# Patient Record
Sex: FEMALE | Race: WHITE
[De-identification: names, ages, dates, MRNs, and addresses within clinical notes are randomized per-mention and may not be internally consistent; named-entity substitution may affect disease eponyms.]

---
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
## 2022-03-16 ENCOUNTER — RX RENEWAL (OUTPATIENT)
Age: 22
End: 2022-03-16

## 2022-08-03 ENCOUNTER — APPOINTMENT (OUTPATIENT)
Dept: OBGYN | Facility: CLINIC | Age: 22
End: 2022-08-03

## 2022-10-19 ENCOUNTER — APPOINTMENT (OUTPATIENT)
Dept: UROLOGY | Facility: CLINIC | Age: 22
End: 2022-10-19

## 2022-11-02 ENCOUNTER — APPOINTMENT (OUTPATIENT)
Dept: UROLOGY | Facility: CLINIC | Age: 22
End: 2022-11-02

## 2022-11-02 VITALS
DIASTOLIC BLOOD PRESSURE: 88 MMHG | SYSTOLIC BLOOD PRESSURE: 137 MMHG | WEIGHT: 210 LBS | HEART RATE: 99 BPM | HEIGHT: 67 IN | BODY MASS INDEX: 32.96 KG/M2 | RESPIRATION RATE: 17 BRPM

## 2022-11-02 DIAGNOSIS — N39.44 NOCTURNAL ENURESIS: ICD-10-CM

## 2022-11-02 PROCEDURE — 99204 OFFICE O/P NEW MOD 45 MIN: CPT

## 2022-11-02 NOTE — PHYSICAL EXAM
[General Appearance - Well Developed] : well developed [General Appearance - Well Nourished] : well nourished [Normal Appearance] : normal appearance [Well Groomed] : well groomed [General Appearance - In No Acute Distress] : no acute distress [Edema] : no peripheral edema [Respiration, Rhythm And Depth] : normal respiratory rhythm and effort [Exaggerated Use Of Accessory Muscles For Inspiration] : no accessory muscle use [Abdomen Soft] : soft [Abdomen Tenderness] : non-tender [Abdomen Mass (___ Cm)] : no abdominal mass palpated [Abdomen Hernia] : no hernia was discovered [Costovertebral Angle Tenderness] : no ~M costovertebral angle tenderness [FreeTextEntry1] : pvr- 10 ml  [Normal Station and Gait] : the gait and station were normal for the patient's age [] : no rash [No Focal Deficits] : no focal deficits [Oriented To Time, Place, And Person] : oriented to person, place, and time [Affect] : the affect was normal [Mood] : the mood was normal [Not Anxious] : not anxious [No Palpable Adenopathy] : no palpable adenopathy

## 2022-11-02 NOTE — HISTORY OF PRESENT ILLNESS
[FreeTextEntry1] : patient with hx of bed wetting that strted after bein toilet trained\par was treated with DDAVP\par stopped 3 years ago and denies any INC \par however has had 3 lb weight gain and irreg menses in the past year where she has noticd some bedwetting]\par denies any change in diet , fluids, travel, sexual partner, denies STDS\par no new school or work env;t\par no new stressors\par denies uits or hematuria \par here for further eval :

## 2022-11-02 NOTE — ASSESSMENT
[FreeTextEntry1] : patient with hx of bed wetting that strted after bein toilet trained\par was treated with DDAVP\par stopped 3 years ago and denies any INC \par however has had 3 lb weight gain and irreg menses in the past year where she has noticd some bedwetting]\par denies any change in diet , fluids, travel, sexual partner, denies STDS\par no new school or work env;t\par no new stressors\par denies uits or hematuria \par here for further eval :\par 1- check urine\par 2- pVR low \par 3- recommend GYN eval hansa due to menses abnl \par 4- voiding diary

## 2022-11-02 NOTE — REVIEW OF SYSTEMS
[Eyesight Problems] : eyesight problems [Heartburn] : heartburn [Genital yeast infection] : genital yeast infection [Strong urge to urinate] : strong urge to urinate [Anxiety] : anxiety [Depression] : depression [Negative] : Neurological [see HPI] : see HPI [Hot Flashes] : hot flashes [Easy Bruising] : a tendency for easy bruising

## 2022-11-04 LAB
APPEARANCE: CLEAR
BACTERIA UR CULT: NORMAL
BACTERIA: NEGATIVE
BILIRUBIN URINE: NEGATIVE
BLOOD URINE: NEGATIVE
COLOR: NORMAL
GLUCOSE QUALITATIVE U: NEGATIVE
HYALINE CASTS: 2 /LPF
KETONES URINE: NEGATIVE
LEUKOCYTE ESTERASE URINE: NEGATIVE
MICROSCOPIC-UA: NORMAL
NITRITE URINE: NEGATIVE
PH URINE: 7
PROTEIN URINE: NEGATIVE
RED BLOOD CELLS URINE: 2 /HPF
SPECIFIC GRAVITY URINE: 1.02
SQUAMOUS EPITHELIAL CELLS: 4 /HPF
UROBILINOGEN URINE: NORMAL
WHITE BLOOD CELLS URINE: 2 /HPF

## 2023-06-05 ENCOUNTER — APPOINTMENT (OUTPATIENT)
Dept: OBGYN | Facility: CLINIC | Age: 23
End: 2023-06-05
Payer: COMMERCIAL

## 2023-06-05 VITALS
SYSTOLIC BLOOD PRESSURE: 132 MMHG | BODY MASS INDEX: 37.67 KG/M2 | HEIGHT: 67 IN | DIASTOLIC BLOOD PRESSURE: 78 MMHG | WEIGHT: 240 LBS

## 2023-06-05 DIAGNOSIS — N91.4 SECONDARY OLIGOMENORRHEA: ICD-10-CM

## 2023-06-05 PROCEDURE — 36415 COLL VENOUS BLD VENIPUNCTURE: CPT

## 2023-06-05 PROCEDURE — 99213 OFFICE O/P EST LOW 20 MIN: CPT | Mod: 25

## 2023-06-05 RX ORDER — NORGESTIMATE AND ETHINYL ESTRADIOL 0.25-0.035
0.25-35 KIT ORAL
Qty: 84 | Refills: 3 | Status: ACTIVE | COMMUNITY
Start: 2022-03-16 | End: 1900-01-01

## 2023-06-05 NOTE — HISTORY OF PRESENT ILLNESS
[FreeTextEntry1] : 24yo nullip LMP 11/2021 here for secondary oligomenorrhea. Pt self dc'ed OCPs at this time. [No] : Patient does not have concerns regarding sex [Previously active] : previously active

## 2023-06-05 NOTE — DISCUSSION/SUMMARY
[FreeTextEntry1] : - hormone panel sent\par - [facility] referral rendered\par - OCPs restarted; return in 3 months for annual exam and medication f/u

## 2023-06-07 LAB
DHEA-S SERPL-MCNC: 254 UG/DL
ESTRADIOL SERPL-MCNC: 41 PG/ML
FSH SERPL-MCNC: 5.6 IU/L
LH SERPL-ACNC: 13.3 IU/L
PROGEST SERPL-MCNC: 0.3 NG/ML
PROLACTIN SERPL-MCNC: 9.6 NG/ML
T4 FREE SERPL-MCNC: 0.9 NG/DL
TESTOST SERPL-MCNC: 82.8 NG/DL
TSH SERPL-ACNC: 1.29 UIU/ML

## 2023-06-08 ENCOUNTER — NON-APPOINTMENT (OUTPATIENT)
Age: 23
End: 2023-06-08

## 2023-06-08 DIAGNOSIS — R79.89 OTHER SPECIFIED ABNORMAL FINDINGS OF BLOOD CHEMISTRY: ICD-10-CM

## 2023-09-05 ENCOUNTER — APPOINTMENT (OUTPATIENT)
Dept: OBGYN | Facility: CLINIC | Age: 23
End: 2023-09-05

## 2024-08-21 ENCOUNTER — NON-APPOINTMENT (OUTPATIENT)
Age: 24
End: 2024-08-21

## 2024-08-22 ENCOUNTER — APPOINTMENT (OUTPATIENT)
Dept: UROLOGY | Facility: CLINIC | Age: 24
End: 2024-08-22
Payer: COMMERCIAL

## 2024-08-22 VITALS
HEIGHT: 67 IN | HEART RATE: 111 BPM | OXYGEN SATURATION: 99 % | WEIGHT: 262 LBS | BODY MASS INDEX: 41.12 KG/M2 | DIASTOLIC BLOOD PRESSURE: 88 MMHG | SYSTOLIC BLOOD PRESSURE: 150 MMHG

## 2024-08-22 VITALS — SYSTOLIC BLOOD PRESSURE: 144 MMHG | DIASTOLIC BLOOD PRESSURE: 85 MMHG

## 2024-08-22 DIAGNOSIS — F42.9 OBSESSIVE-COMPULSIVE DISORDER, UNSPECIFIED: ICD-10-CM

## 2024-08-22 DIAGNOSIS — N39.44 NOCTURNAL ENURESIS: ICD-10-CM

## 2024-08-22 DIAGNOSIS — F32.A DEPRESSION, UNSPECIFIED: ICD-10-CM

## 2024-08-22 PROCEDURE — 99214 OFFICE O/P EST MOD 30 MIN: CPT

## 2024-08-22 RX ORDER — OMEPRAZOLE 20 MG/1
TABLET, DELAYED RELEASE ORAL
Refills: 0 | Status: ACTIVE | COMMUNITY

## 2024-08-22 RX ORDER — BUPROPION HYDROCHLORIDE 150 MG/1
150 TABLET, FILM COATED ORAL
Refills: 0 | Status: ACTIVE | COMMUNITY

## 2024-08-22 RX ORDER — DESMOPRESSIN ACETATE 0.2 MG/1
TABLET ORAL
Refills: 0 | Status: ACTIVE | COMMUNITY

## 2024-08-22 RX ORDER — FLUVOXAMINE MALEATE 150 MG/1
150 CAPSULE, EXTENDED RELEASE ORAL
Refills: 0 | Status: ACTIVE | COMMUNITY

## 2024-08-24 NOTE — LETTER BODY
[Dear  ___] : Dear  [unfilled], [Consult Letter:] : I had the pleasure of evaluating your patient, [unfilled]. [Please see my note below.] : Please see my note below. [Consult Closing:] : Thank you very much for allowing me to participate in the care of this patient.  If you have any questions, please do not hesitate to contact me. [FreeTextEntry1] : Please see my note. I will keep you informed. [FreeTextEntry3] : Sincerely,  Esperanza Carrasco MD Clinical  [facility]

## 2024-08-24 NOTE — HISTORY OF PRESENT ILLNESS
[FreeTextEntry1] : 08/22/2024: GARY AGUILAR is a 24 year-old F who presents with mom for urinary frequency during daytime and nocturnal enuresis.  Was given Desmopressin by PCP, Dr. Marie. - was on and off until it came back again, and she started taking it again. She is currently on it right now. - Takes about 0.6mg each day (x3's/day at 0.2mg each pill) - Reports it is helping her symptoms  As per Mom, pt was a full-term baby, normal healthy pregnancy and delivered vaginally - prenatal US fine - Fluid levels fine  As a baby she trained and learned how to hold her urine and hit milestones with potty training at normal age. But, she has however, always been a bed wetter  Denies urge/stress incontinence Voids x5's/day Sleeps for 8-10 hours She is a deep sleeper  Norm bowels No constipation  Still gets a period every month Never GH; Never hospitalized for UTI Mother of pt and the patient deny childhood UTI, febrile or complicated UTI. Never had stones.  Denies FH of bladder, prostate, kidney ca Denies FH of GYN cancers  Smokes marijuana frequently, a risk factor for bladder ca and other ca

## 2024-08-24 NOTE — ASSESSMENT
[FreeTextEntry1] : We addressed the questionnaire, score, and need for follow up care regarding the pt's depression. She is seeking a therapist and it was explained to her that she needs to Speak w her PCP about her depression and need for referral to appropriate professional.  Reached out to [facility] in order that she may obtain resources to evaluate her and help with her problem.  I explained that urinary frequency and nocturia is normal as our kidneys are always involuntary producing urine. As the pt sleeps for about 10 hours I urged her to sleep for a shorter period of time which may help lessen her bed wetting behavior. In addition, I recommend that she try to cut fluids several hours prior to going to bed.  I also recommend timed voiding, a behavioral practice where the pt sets an alarm every 4-5 hours to void, even against the urge to hold in urine.  She should set her clock to wake up 4 hrs into her sleep to void.

## 2024-08-24 NOTE — LETTER BODY
[Dear  ___] : Dear  [unfilled], [Consult Letter:] : I had the pleasure of evaluating your patient, [unfilled]. [Please see my note below.] : Please see my note below. [Consult Closing:] : Thank you very much for allowing me to participate in the care of this patient.  If you have any questions, please do not hesitate to contact me. [FreeTextEntry1] : Please see my note. I will keep you informed. [FreeTextEntry3] : Sincerely,  Esperanza Carrasco MD Clinical  [facility] for [facility]rology [facility]

## 2024-08-24 NOTE — ASSESSMENT
[FreeTextEntry1] : We addressed the questionnaire, score, and need for follow up care regarding the pt's depression. She is seeking a therapist and it was explained to her that she needs to Speak w her PCP about her depression and need for referral to appropriate professional.  Reached out to [facility] in order that she may obtain resources to evaluate her and help with her problem.  I explained that urinary frequency and nocturia is normal as our kidneys are always involuntary producing urine. As the pt sleeps for about 10 hours I urged her to sleep for a shorter period of time which may help lessen her bed wetting behavior. In addition, I recommend that she try to cut fluids several hours prior to going to bed.  I also recommend timed voiding, a behavioral practice where the pt sets an alarm every 4-5 hours to void, even against the urge to hold in urine.  She should set her clock to wake up 4 hrs into her sleep to void.

## 2024-08-24 NOTE — ADDENDUM
[FreeTextEntry1] : This note was partly authored by Javed Klein working as a scribe for KALEY Reynolds. I, KALEY Reynolds, have reviewed the content of this note and confirm it is true and accurate. I personally performed the history and physical examination and made all the decisions. 08/22/2024.

## 2025-07-15 ENCOUNTER — APPOINTMENT (OUTPATIENT)
Dept: OBGYN | Facility: CLINIC | Age: 25
End: 2025-07-15

## 2025-07-15 ENCOUNTER — LABORATORY RESULT (OUTPATIENT)
Age: 25
End: 2025-07-15

## 2025-07-15 VITALS
DIASTOLIC BLOOD PRESSURE: 81 MMHG | HEIGHT: 67 IN | WEIGHT: 224 LBS | BODY MASS INDEX: 35.16 KG/M2 | SYSTOLIC BLOOD PRESSURE: 125 MMHG

## 2025-07-15 PROCEDURE — 99214 OFFICE O/P EST MOD 30 MIN: CPT | Mod: 25

## 2025-07-15 PROCEDURE — 36415 COLL VENOUS BLD VENIPUNCTURE: CPT

## 2025-07-15 PROCEDURE — 99395 PREV VISIT EST AGE 18-39: CPT

## 2025-07-15 PROCEDURE — 99459 PELVIC EXAMINATION: CPT | Mod: NC

## 2025-07-20 LAB
C TRACH RRNA SPEC QL NAA+PROBE: NOT DETECTED
CYTOLOGY CVX/VAG DOC THIN PREP: NORMAL
HBV SURFACE AG SER QL: NONREACTIVE
HCV AB SER QL: NONREACTIVE
HCV S/CO RATIO: 0.1 S/CO
HIV1+2 AB SPEC QL IA.RAPID: NONREACTIVE
N GONORRHOEA RRNA SPEC QL NAA+PROBE: NOT DETECTED
SOURCE TP AMPLIFICATION: NORMAL
T PALLIDUM AB SER QL IA: NEGATIVE